# Patient Record
(demographics unavailable — no encounter records)

---

## 2025-07-04 NOTE — HISTORY OF PRESENT ILLNESS
[No Personal or Family History of Easy Bruising, Bleeding, or Issues with General Anesthesia] : No Personal or Family History of easy bruising, bleeding, or issues with general anesthesia [de-identified] : Coreen is a 7 year old girl here for initial evaluation of enlarged tonsils and snoring. Mother is with her to provide history. Referred by Dr. Tere Phillips.  Reports snoring. Denies choking, gasping, and witnessed pauses. No prior sleep study. Mouth breather and restless sleeper. Has daytime sleepiness. Denies chronic nasal congestion - has used steroidal nasal sprays before with some relief.  Denies recent throat infections. Mom with history of tonsillectomy for similar symptoms when younger.   Denies otalgia, otorrhea, or recent ear infections. Passed NBHT. No other otolaryngology concerns today.

## 2025-07-04 NOTE — ASSESSMENT
[FreeTextEntry1] : We discussed options including expectant management with monitoring for signs of sleep apnea (incluidng pauses in breathing), obtaining sleep study, and proceeding with adenotonsillectomy given symptoms correlating with exam findings today.  We discussed the risks, benefits, alternatives, and personnel involved in adenotonsillectomy. The risks include, but are not limited to, post-tonsillectomy bleeding that can range from minimal to life threatening, dehydration, throat pain, and speech changes. Expectations of one week out of school, two weeks out of sports/P.E., need for encouragement of fluid intake, limitation of diet to restrict hard/crunchy foods were discussed. If bleeding were to occur post-operatively parent is to bring the child immediately to the nearest emergency department.   Mom would like to further observe sleep at home to see if she identifies signs of sleep apnea.  I recommend the use of Flonase one spray per nostril once per day. We reviewed the proper administration techniques and a prescription was provided. Follow up in 1-2 months.

## 2025-07-04 NOTE — PHYSICAL EXAM
[Clear to Auscultation] : lungs were clear to auscultation bilaterally [Wheezing] : no wheezing [Normal Gait and Station] : normal gait and station [Normal muscle strength, symmetry and tone of facial, head and neck musculature] : normal muscle strength, symmetry and tone of facial, head and neck musculature [Normal] : no cervical lymphadenopathy [Exposed Vessel] : left anterior vessel not exposed [Increased Work of Breathing] : no increased work of breathing with use of accessory muscles and retractions [de-identified] : 3-4+

## 2025-07-04 NOTE — CONSULT LETTER
[Courtesy Letter:] : I had the pleasure of seeing your patient, [unfilled], in my office today. [Please see my note below.] : Please see my note below. [Sincerely,] : Sincerely, [FreeTextEntry2] : Tere Phillips [FreeTextEntry3] : Dolores Mayes M.D. [place] [address] Tel [phone] Fax [phone]

## 2025-07-04 NOTE — CONSULT LETTER
[Courtesy Letter:] : I had the pleasure of seeing your patient, [unfilled], in my office today. [Please see my note below.] : Please see my note below. [Sincerely,] : Sincerely, [FreeTextEntry2] : Tere Phillips [FreeTextEntry3] : Dolores Mayes [place] [address] Tel [phone] Fax [phone]

## 2025-07-04 NOTE — PHYSICAL EXAM
[Clear to Auscultation] : lungs were clear to auscultation bilaterally [Wheezing] : no wheezing [Normal Gait and Station] : normal gait and station [Normal muscle strength, symmetry and tone of facial, head and neck musculature] : normal muscle strength, symmetry and tone of facial, head and neck musculature [Normal] : no cervical lymphadenopathy [Exposed Vessel] : left anterior vessel not exposed [Increased Work of Breathing] : no increased work of breathing with use of accessory muscles and retractions [de-identified] : 3-4+

## 2025-07-04 NOTE — HISTORY OF PRESENT ILLNESS
[No Personal or Family History of Easy Bruising, Bleeding, or Issues with General Anesthesia] : No Personal or Family History of easy bruising, bleeding, or issues with general anesthesia [de-identified] : Coreen is a 7 year old girl here for initial evaluation of enlarged tonsils and snoring. Mother is with her to provide history. Referred by Dr. Tere Phillips.  Reports snoring. Denies choking, gasping, and witnessed pauses. No prior sleep study. Mouth breather and restless sleeper. Has daytime sleepiness. Denies chronic nasal congestion - has used steroidal nasal sprays before with some relief.  Denies recent throat infections. Mom with history of tonsillectomy for similar symptoms when younger.   Denies otalgia, otorrhea, or recent ear infections. Passed NBHT. No other otolaryngology concerns today.